# Patient Record
Sex: MALE | Race: WHITE | Employment: STUDENT | URBAN - METROPOLITAN AREA
[De-identification: names, ages, dates, MRNs, and addresses within clinical notes are randomized per-mention and may not be internally consistent; named-entity substitution may affect disease eponyms.]

---
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
## 2024-01-12 ENCOUNTER — OFFICE VISIT (OUTPATIENT)
Dept: PRIMARY CARE CLINIC | Age: 19
End: 2024-01-12

## 2024-01-12 VITALS
BODY MASS INDEX: 37.77 KG/M2 | HEART RATE: 100 BPM | OXYGEN SATURATION: 98 % | TEMPERATURE: 98.4 F | WEIGHT: 235 LBS | HEIGHT: 66 IN

## 2024-01-12 DIAGNOSIS — J02.9 SORE THROAT: Primary | ICD-10-CM

## 2024-01-12 DIAGNOSIS — H92.02 OTALGIA, LEFT: ICD-10-CM

## 2024-01-12 DIAGNOSIS — J01.10 ACUTE NON-RECURRENT FRONTAL SINUSITIS: ICD-10-CM

## 2024-01-12 DIAGNOSIS — R51.9 ACUTE NONINTRACTABLE HEADACHE, UNSPECIFIED HEADACHE TYPE: ICD-10-CM

## 2024-01-12 DIAGNOSIS — R05.1 ACUTE COUGH: ICD-10-CM

## 2024-01-12 DIAGNOSIS — R09.81 NASAL CONGESTION: ICD-10-CM

## 2024-01-12 LAB
INFLUENZA A ANTIBODY: NEGATIVE
INFLUENZA B ANTIBODY: NEGATIVE
S PYO AG THROAT QL: NORMAL

## 2024-01-12 RX ORDER — METHYLPREDNISOLONE 4 MG/1
TABLET ORAL
Qty: 21 TABLET | Refills: 0 | Status: SHIPPED | OUTPATIENT
Start: 2024-01-12

## 2024-01-12 RX ORDER — BENZONATATE 100 MG/1
100 CAPSULE ORAL 3 TIMES DAILY PRN
Qty: 45 CAPSULE | Refills: 0 | Status: SHIPPED | OUTPATIENT
Start: 2024-01-12

## 2024-01-12 ASSESSMENT — ENCOUNTER SYMPTOMS
COUGH: 1
ALLERGIC/IMMUNOLOGIC NEGATIVE: 1
SORE THROAT: 1
EYES NEGATIVE: 1
SINUS PAIN: 1
GASTROINTESTINAL NEGATIVE: 1
WHEEZING: 0
SINUS PRESSURE: 1

## 2024-01-12 NOTE — PROGRESS NOTES
lozenges, warm teas and honey, salt water gargles, humidified air, drink cold fluids for relief. Increase fluid intake. May return to activity when fever free for 24 hours without the use of fever-reducing medications, such as tylenol or ibuprofen.   - Tessalon and Medrol sent to help with symptoms.   - Patient education added to AVS.    Return if symptoms worsen or fail to improve.     Subjective   SUBJECTIVE/OBJECTIVE:  Cough  This is a new problem. The current episode started yesterday. The problem has been waxing and waning. The problem occurs every few minutes. The cough is Non-productive. Associated symptoms include chills, ear pain, headaches, nasal congestion and a sore throat. Pertinent negatives include no wheezing. He has tried nothing for the symptoms.   Pharyngitis  This is a new problem. The current episode started yesterday. The problem occurs constantly. The problem has been unchanged. Associated symptoms include chills, congestion, coughing, headaches and a sore throat. The symptoms are aggravated by swallowing. He has tried NSAIDs for the symptoms. The treatment provided moderate relief.     Review of Systems   Constitutional:  Positive for chills.   HENT:  Positive for congestion, ear pain, sinus pressure, sinus pain and sore throat.    Eyes: Negative.    Respiratory:  Positive for cough. Negative for wheezing.    Cardiovascular: Negative.    Gastrointestinal: Negative.    Endocrine: Negative.    Genitourinary: Negative.    Musculoskeletal: Negative.    Skin: Negative.    Allergic/Immunologic: Negative.    Neurological:  Positive for headaches.   Hematological: Negative.    Psychiatric/Behavioral: Negative.        Objective   Physical Exam  Vitals and nursing note reviewed.   Constitutional:       Appearance: He is ill-appearing.   HENT:      Head: Normocephalic.      Right Ear: Tympanic membrane normal.      Nose: Congestion and rhinorrhea present.      Mouth/Throat:      Mouth: Mucous membranes

## 2024-01-13 LAB — SARS-COV-2 RNA RESP QL NAA+PROBE: NOT DETECTED

## 2024-01-14 LAB — BACTERIA THROAT AEROBE CULT: NORMAL

## 2024-03-07 ENCOUNTER — OFFICE VISIT (OUTPATIENT)
Dept: PRIMARY CARE CLINIC | Age: 19
End: 2024-03-07
Payer: COMMERCIAL

## 2024-03-07 VITALS
HEART RATE: 112 BPM | TEMPERATURE: 97.2 F | HEIGHT: 66 IN | BODY MASS INDEX: 36.64 KG/M2 | OXYGEN SATURATION: 98 % | WEIGHT: 228 LBS

## 2024-03-07 DIAGNOSIS — J02.0 STREP THROAT: ICD-10-CM

## 2024-03-07 DIAGNOSIS — J02.9 SORE THROAT: Primary | ICD-10-CM

## 2024-03-07 DIAGNOSIS — R51.9 ACUTE NONINTRACTABLE HEADACHE, UNSPECIFIED HEADACHE TYPE: ICD-10-CM

## 2024-03-07 DIAGNOSIS — M79.10 MYALGIA: ICD-10-CM

## 2024-03-07 DIAGNOSIS — R50.9 FEVER, UNSPECIFIED FEVER CAUSE: ICD-10-CM

## 2024-03-07 LAB
INFLUENZA A ANTIBODY: NEGATIVE
INFLUENZA B ANTIBODY: NEGATIVE
S PYO AG THROAT QL: POSITIVE

## 2024-03-07 PROCEDURE — 87880 STREP A ASSAY W/OPTIC: CPT

## 2024-03-07 PROCEDURE — 87804 INFLUENZA ASSAY W/OPTIC: CPT

## 2024-03-07 PROCEDURE — 99213 OFFICE O/P EST LOW 20 MIN: CPT

## 2024-03-07 RX ORDER — AMOXICILLIN 500 MG/1
500 CAPSULE ORAL 2 TIMES DAILY
Qty: 20 CAPSULE | Refills: 0 | Status: SHIPPED | OUTPATIENT
Start: 2024-03-07 | End: 2024-03-17

## 2024-03-07 ASSESSMENT — ENCOUNTER SYMPTOMS
SWOLLEN GLANDS: 1
GASTROINTESTINAL NEGATIVE: 1
RESPIRATORY NEGATIVE: 1
EYES NEGATIVE: 1
ALLERGIC/IMMUNOLOGIC NEGATIVE: 1
SORE THROAT: 1
VOICE CHANGE: 1

## 2024-03-07 NOTE — PROGRESS NOTES
Shiva Boswell (:  2005) is a 18 y.o. male,Established patient, here for evaluation of the following chief complaint(s):  Pharyngitis (Patient presents today five day history of symptoms. States  he had a fever of 103. Complains of sore throat, headache, chills, body aches. Brother tested positive Flu. Wasn't around him much. Has tried otc Advil cold/sinus, Ibuprofen and Tylenol for symptoms. ), Generalized Body Aches, and Headache    Shiva is a patient of Dr. Roblero, here today with concerns for flu-like symptoms. Started with symptoms about 5 days ago with a sore throat, headache, myalgia, fever, and chills.  No real cough or congestion, n/v/d.  Flu exposure at home. Is having some voice changes and has swollen tonsils and tender glands.        ASSESSMENT/PLAN:  1. Sore throat  -     POCT rapid strep A  -     POCT Influenza A/B  2. Myalgia  3. Fever, unspecified fever cause  4. Acute nonintractable headache, unspecified headache type  5. Strep throat  -     amoxicillin (AMOXIL) 500 MG capsule; Take 1 capsule by mouth 2 times daily for 10 days, Disp-20 capsule, R-0Normal    - POCT Rapid Strep: POSITIVE; Antibiotic therapy sent to pharmacy.  - POCT Influenza A/B: NEGATIVE  - Discussed symptomatic management.  May take tylenol or ibuprofen as needed for pain or fever.  May use throat lozenges, warm teas and honey, salt water gargles, humidified air, drink cold fluids for relief. Increase fluid intake. May return to activity when fever free for 24 hours without the use of fever-reducing medications, such as tylenol or ibuprofen.   - Must complete 12 hours of antibiotics prior to returning to normal activity.  - Change toothbrush after 2 days of antibiotics.   - Patient education added to AVS.    Return if symptoms worsen or fail to improve.     Subjective   SUBJECTIVE/OBJECTIVE:  Pharyngitis  This is a new problem. The current episode started in the past 7 days. The problem occurs constantly. The

## 2024-08-13 ENCOUNTER — OFFICE VISIT (OUTPATIENT)
Dept: PRIMARY CARE CLINIC | Age: 19
End: 2024-08-13
Payer: COMMERCIAL

## 2024-08-13 VITALS
HEART RATE: 81 BPM | TEMPERATURE: 97 F | HEIGHT: 66 IN | BODY MASS INDEX: 36.96 KG/M2 | WEIGHT: 230 LBS | OXYGEN SATURATION: 97 %

## 2024-08-13 DIAGNOSIS — J02.9 SORE THROAT: Primary | ICD-10-CM

## 2024-08-13 DIAGNOSIS — J02.0 STREP THROAT: ICD-10-CM

## 2024-08-13 LAB — S PYO AG THROAT QL: POSITIVE

## 2024-08-13 PROCEDURE — 99213 OFFICE O/P EST LOW 20 MIN: CPT

## 2024-08-13 PROCEDURE — 87880 STREP A ASSAY W/OPTIC: CPT

## 2024-08-13 RX ORDER — AMOXICILLIN AND CLAVULANATE POTASSIUM 875; 125 MG/1; MG/1
1 TABLET, FILM COATED ORAL 2 TIMES DAILY
Qty: 20 TABLET | Refills: 0 | Status: SHIPPED | OUTPATIENT
Start: 2024-08-13 | End: 2024-08-23

## 2024-08-13 ASSESSMENT — ENCOUNTER SYMPTOMS
SWOLLEN GLANDS: 1
GASTROINTESTINAL NEGATIVE: 1
SORE THROAT: 1
EYES NEGATIVE: 1
RESPIRATORY NEGATIVE: 1
ALLERGIC/IMMUNOLOGIC NEGATIVE: 1

## 2024-08-13 NOTE — PROGRESS NOTES
Shiva Boswell (:  2005) is a 19 y.o. male,Established patient, here for evaluation of the following chief complaint(s):  Pharyngitis (Patient presents for four day hisory of symptoms. C/o sore throat, headache, body aches, chills. Denies fever n/v/d. Has tried ibuprofen otc. ) and Headache    Shiva is here today with concerns of a sore throat.  States that he was treated a month ago at an Urgent Care for strep with amoxil.  He is now on day 5 of a sore throat, headache, body aches, and chills.  Thinks he might have had a fever last night but he didn't check his temperature. He is also having trouble sleeping at night.    Assessment & Plan  Sore throat  Orders:    POCT rapid strep A    Culture, Throat    Strep throat  Orders:    amoxicillin-clavulanate (AUGMENTIN) 875-125 MG per tablet; Take 1 tablet by mouth 2 times daily for 10 days    - POCT Rapid Strep: POSITIVE; Antibiotic therapy sent to pharmacy.   - Due to frequent infections, will send for culture.   - Discussed symptomatic management.  May take tylenol or ibuprofen as needed for pain or fever.  May use throat lozenges, warm teas and honey, salt water gargles, humidified air, drink cold fluids for relief. Increase fluid intake. May return to activity when fever free for 24 hours without the use of fever-reducing medications, such as tylenol or ibuprofen.   - Must complete 12 hours of antibiotic therapy prior to returning to normal activity.  - Change toothbrush after 2 days of antibiotics.   - Can try Unisom at night to help with sleep.  - Patient education added to AVS.  - Work note provided.     Return if symptoms worsen or fail to improve.     Subjective   Pharyngitis  This is a new problem. The current episode started in the past 7 days. The problem occurs constantly. The problem has been gradually worsening. Associated symptoms include chills, fatigue, a fever, headaches, myalgias, a sore throat and swollen glands. The symptoms are

## 2024-08-15 LAB — BACTERIA THROAT AEROBE CULT: NORMAL

## 2025-05-02 ENCOUNTER — RESULTS FOLLOW-UP (OUTPATIENT)
Dept: PRIMARY CARE CLINIC | Age: 20
End: 2025-05-02

## 2025-05-02 ENCOUNTER — OFFICE VISIT (OUTPATIENT)
Dept: PRIMARY CARE CLINIC | Age: 20
End: 2025-05-02

## 2025-05-02 VITALS
HEART RATE: 80 BPM | OXYGEN SATURATION: 98 % | BODY MASS INDEX: 36.32 KG/M2 | HEIGHT: 66 IN | TEMPERATURE: 98.1 F | WEIGHT: 226 LBS

## 2025-05-02 DIAGNOSIS — R11.2 NAUSEA AND VOMITING, UNSPECIFIED VOMITING TYPE: Primary | ICD-10-CM

## 2025-05-02 DIAGNOSIS — J02.0 STREP THROAT: ICD-10-CM

## 2025-05-02 DIAGNOSIS — L23.7 POISON IVY DERMATITIS: ICD-10-CM

## 2025-05-02 LAB — S PYO AG THROAT QL: POSITIVE

## 2025-05-02 RX ORDER — ONDANSETRON 4 MG/1
4 TABLET, ORALLY DISINTEGRATING ORAL 3 TIMES DAILY PRN
Qty: 21 TABLET | Refills: 0 | Status: SHIPPED | OUTPATIENT
Start: 2025-05-02

## 2025-05-02 RX ORDER — METHYLPREDNISOLONE 4 MG/1
TABLET ORAL
Qty: 1 KIT | Refills: 0 | Status: SHIPPED | OUTPATIENT
Start: 2025-05-02 | End: 2025-05-08

## 2025-05-02 RX ORDER — TRIAMCINOLONE ACETONIDE 1 MG/G
OINTMENT TOPICAL 2 TIMES DAILY
Qty: 15 G | Refills: 2 | Status: SHIPPED | OUTPATIENT
Start: 2025-05-02 | End: 2025-05-09

## 2025-05-02 ASSESSMENT — ENCOUNTER SYMPTOMS
EYES NEGATIVE: 1
RESPIRATORY NEGATIVE: 1
ALLERGIC/IMMUNOLOGIC NEGATIVE: 1
NAUSEA: 1

## 2025-05-02 ASSESSMENT — PATIENT HEALTH QUESTIONNAIRE - PHQ9
SUM OF ALL RESPONSES TO PHQ QUESTIONS 1-9: 0
SUM OF ALL RESPONSES TO PHQ QUESTIONS 1-9: 0
1. LITTLE INTEREST OR PLEASURE IN DOING THINGS: NOT AT ALL
2. FEELING DOWN, DEPRESSED OR HOPELESS: NOT AT ALL
SUM OF ALL RESPONSES TO PHQ QUESTIONS 1-9: 0
SUM OF ALL RESPONSES TO PHQ QUESTIONS 1-9: 0

## 2025-05-02 NOTE — PROGRESS NOTES
Shiva Boswell (:  2005) is a 20 y.o. male,Established patient, here for evaluation of the following chief complaint(s):  Nausea (Patient presents today with two day history of symptoms. Severely nauseated, fatigue and weak feeling. Has not thrown up but feels like he has to. Felt like he might had a fever last night. Hot/cold chills and body aches. Denies diarrhea, sore throat. Can drink water but has not really eaten since Tuesday. ) and Fatigue    Shiva is here today for concerns of nausea, fatigue, weakness, and fever/chills. Started yesterday, has had sick contacts at home and at work. Appetite has been low as well. Denies sore throat, but it is erythremic.     He also has a rash on his bilateral forearms.  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  States the one on his left forearm has been there for several weeks, unsure the cause.  Sometimes itchy, but has been scabbed over the last couple weeks.   Assessment & Plan  Nausea and vomiting, unspecified vomiting type  Orders:    ondansetron (ZOFRAN-ODT) 4 MG disintegrating tablet; Take 1 tablet by mouth 3 times daily as needed for Nausea or Vomiting    POCT rapid strep A    Poison ivy dermatitis  Orders:    methylPREDNISolone (MEDROL, NEHEMIAH,) 4 MG tablet; Take by mouth.    triamcinolone (KENALOG) 0.1 % ointment; Apply topically 2 times daily for 7 days    Strep throat  Orders:    amoxicillin-clavulanate (AUGMENTIN) 875-125 MG per tablet; Take 1 tablet by mouth 2 times daily for 10 days    - POCT Rapid Strep: POSITIVE; Antibiotic therapy sent to pharmacy. Instructed on use.   - Medrol and kenalog sent for rash. Instructed on use.  - Discussed symptomatic management.  May take tylenol or ibuprofen as needed for pain or fever.  May use throat lozenges, warm teas and honey, salt water gargles, humidified air, drink cold fluids for relief. Increase fluid intake. May return to activity when fever free for 24 hours without the

## 2025-06-03 ENCOUNTER — OFFICE VISIT (OUTPATIENT)
Dept: PRIMARY CARE CLINIC | Age: 20
End: 2025-06-03

## 2025-06-03 VITALS
TEMPERATURE: 98.2 F | SYSTOLIC BLOOD PRESSURE: 136 MMHG | HEART RATE: 97 BPM | OXYGEN SATURATION: 98 % | BODY MASS INDEX: 36.48 KG/M2 | HEIGHT: 66 IN | DIASTOLIC BLOOD PRESSURE: 92 MMHG | WEIGHT: 227 LBS

## 2025-06-03 DIAGNOSIS — L23.7 POISON IVY DERMATITIS: Primary | ICD-10-CM

## 2025-06-03 DIAGNOSIS — R03.0 ELEVATED BLOOD PRESSURE READING: ICD-10-CM

## 2025-06-03 RX ORDER — PREDNISONE 20 MG/1
TABLET ORAL
Qty: 15 TABLET | Refills: 0 | Status: SHIPPED | OUTPATIENT
Start: 2025-06-03 | End: 2025-06-13

## 2025-06-03 RX ORDER — HYDROXYZINE HYDROCHLORIDE 25 MG/1
25 TABLET, FILM COATED ORAL EVERY 8 HOURS PRN
Qty: 30 TABLET | Refills: 0 | Status: SHIPPED | OUTPATIENT
Start: 2025-06-03 | End: 2025-06-13

## 2025-06-03 RX ORDER — TRIAMCINOLONE ACETONIDE 1 MG/G
OINTMENT TOPICAL 2 TIMES DAILY
Qty: 30 G | Refills: 3 | Status: SHIPPED | OUTPATIENT
Start: 2025-06-03 | End: 2025-06-10

## 2025-06-03 RX ORDER — METHYLPREDNISOLONE SODIUM SUCCINATE 40 MG/ML
80 INJECTION INTRAMUSCULAR; INTRAVENOUS ONCE
Status: COMPLETED | OUTPATIENT
Start: 2025-06-03 | End: 2025-06-03

## 2025-06-03 RX ADMIN — METHYLPREDNISOLONE SODIUM SUCCINATE 80 MG: 40 INJECTION INTRAMUSCULAR; INTRAVENOUS at 13:50

## 2025-06-03 ASSESSMENT — ENCOUNTER SYMPTOMS
GASTROINTESTINAL NEGATIVE: 1
ALLERGIC/IMMUNOLOGIC NEGATIVE: 1
EYES NEGATIVE: 1
RESPIRATORY NEGATIVE: 1

## 2025-06-03 NOTE — PROGRESS NOTES
Shiva Boswell (:  2005) is a 20 y.o. male,Established patient, here for evaluation of the following chief complaint(s):  Rash (Patient presents today for one day history of symptoms. States when he was at work yesterday got into some poison ivy. Has it on his face, genitals, and in between fingers. )    Shiva is here today with concerns for poison ivy rash.  States he woke up yesterday and had some redness and a little swelling on the left side of his face and around his left eye, but today woke up and it is in-between his fingers and in his groin.  Very itchy, has not used any OTC treatments.   Assessment & Plan  Poison ivy dermatitis  Orders:    hydrOXYzine HCl (ATARAX) 25 MG tablet; Take 1 tablet by mouth every 8 hours as needed for Itching    triamcinolone (KENALOG) 0.1 % ointment; Apply topically 2 times daily for 7 days    predniSONE (DELTASONE) 20 MG tablet; Take 2 tablets by mouth daily for 5 days, THEN 1 tablet daily for 5 days.    methylPREDNISolone sodium succ (SOLU-MEDROL) injection 80 mg    Elevated blood pressure reading    Return in 2 weeks for BP recheck       - IM Solu-Medrol given in office today. Tolerated well.   - Sent oral steroid taper to start tomorrow afternoon.  - Hydroxyzine and Kenalog sent to pharmacy. Instructed on use.   - Try not to scratch the rash to avoid secondary bacterial infection.   - Return for establish care and BP recheck  - Patient education added to AVS.     Administrations This Visit       methylPREDNISolone sodium succ (SOLU-MEDROL) injection 80 mg       Admin Date  2025 Action  Given Dose  80 mg Route  [employer] Documented By  Tayla Otto, TON - CNP                  Return in about 2 weeks (around 2025) for Establish care/BP follow up.     Subjective   Poison Ivy  This is a new problem. The current episode started yesterday. The problem has been rapidly worsening since onset. The affected locations include the face, groin, left fingers